# Patient Record
Sex: MALE | Race: OTHER | URBAN - METROPOLITAN AREA
[De-identification: names, ages, dates, MRNs, and addresses within clinical notes are randomized per-mention and may not be internally consistent; named-entity substitution may affect disease eponyms.]

---

## 2017-07-01 ENCOUNTER — OFFICE VISIT (OUTPATIENT)
Dept: FAMILY MEDICINE CLINIC | Age: 27
End: 2017-07-01

## 2017-07-01 VITALS
DIASTOLIC BLOOD PRESSURE: 81 MMHG | HEIGHT: 63 IN | BODY MASS INDEX: 29.23 KG/M2 | SYSTOLIC BLOOD PRESSURE: 135 MMHG | TEMPERATURE: 97.9 F | HEART RATE: 53 BPM | WEIGHT: 165 LBS

## 2017-07-01 DIAGNOSIS — H54.7 VISION PROBLEM: ICD-10-CM

## 2017-07-01 DIAGNOSIS — Z13.9 ENCOUNTER FOR SCREENING: Primary | ICD-10-CM

## 2017-07-01 LAB — GLUCOSE POC: NORMAL MG/DL

## 2017-07-01 NOTE — PROGRESS NOTES
HISTORY OF PRESENT ILLNESS  Dustin Mir is a 32 y.o. male. HPI Comments: Reports that for the last 15 years he has had trouble seeing. Describes it as mostly intermittent, like reading letters sometimes he just sees lines, other times can see the letters. Thinks vision has gotten better in general.  Hasn't really noticed that he can't see well at distances all of the time. Denies fh eye dx, has never been to vision/eye doctor. Eye Problem         ROS    Physical Exam   Eyes: Conjunctivae and EOM are normal. Pupils are equal, round, and reactive to light. Right eye exhibits no discharge. Left eye exhibits no discharge. Fundi and irises, cornea are all normal.         ASSESSMENT and PLAN  Thought he could do better on the eye chart if he tried again, and was able to see at 20/50 with both eyes together.   rec he go to a local optometrist.

## 2017-07-01 NOTE — PROGRESS NOTES
The following was performed at discharge station per provider with the assistance of , Francisco Maria:  RN gave pt a copy of local vision resources and reviewed these with him. RN advised pt that if the optometrist advises him to see an opthalmalogist, he should return to the [facility] to let us know, and he could be referred to [facility]. Pt expressed understanding of the above information. Rashmi Mendez.

## 2017-07-01 NOTE — PROGRESS NOTES
Coordination of Care  1. Have you been to the ER, urgent care clinic since your last visit? Hospitalized since your last visit? No    2. Have you seen or consulted any other health care providers outside of the [address] [place] [address] since your last visit? Include any pap smears or colon screening. No    Medications  Medication Reconciliation Performed: no  Patient does not know need refills     Learning Assessment Complete?  yes

## 2017-07-01 NOTE — PROGRESS NOTES
Results for orders placed or performed in visit on 07/01/17   AMB POC GLUCOSE BLOOD, BY GLUCOSE MONITORING DEVICE   Result Value Ref Range    Glucose POC 88 F mg/dL

## 2018-05-05 ENCOUNTER — OFFICE VISIT (OUTPATIENT)
Dept: FAMILY MEDICINE CLINIC | Age: 28
End: 2018-05-05

## 2018-05-05 VITALS
WEIGHT: 172.6 LBS | SYSTOLIC BLOOD PRESSURE: 123 MMHG | TEMPERATURE: 97.8 F | HEART RATE: 53 BPM | DIASTOLIC BLOOD PRESSURE: 78 MMHG | BODY MASS INDEX: 30.58 KG/M2 | HEIGHT: 63 IN

## 2018-05-05 DIAGNOSIS — L72.9 SUBCUTANEOUS CYST: ICD-10-CM

## 2018-05-05 DIAGNOSIS — S43.491A SPRAIN OF OTHER PART OF RIGHT SHOULDER REGION, INITIAL ENCOUNTER: Primary | ICD-10-CM

## 2018-05-05 RX ORDER — IBUPROFEN 800 MG/1
800 TABLET ORAL
Qty: 30 TAB | Refills: 0 | Status: SHIPPED | OUTPATIENT
Start: 2018-05-05

## 2018-05-05 NOTE — MR AVS SNAPSHOT
[address] [facility] 
 
 
 [place]  [address] 
[phone] Patient: Joan Mendez MRN: [id number] :1990 Visit Information Cely Murphy Personal Médico Departamento Teléfono del Dep. Número de visita 2018  9:30 AM Snehal Hayden NP [facility] 65 [phone] Upcoming Health Maintenance Date Due DTaP/Tdap/Td series (1 - Tdap) 10/1/2011 Influenza Age 5 to Adult 2018 Alergias  Review Complete El: 2018 Por: ADRIEL Ryan del:  2018 No Known Allergies Vacunas actuales Rollo Pupa No hay ninguna vacuna archivada. No revisadas esta visita You Were Diagnosed With   
  
 Leslie Cable Sprain of other part of right shoulder region, initial encounter    -  Primary ICD-10-CM: X33.245W ICD-9-CM: 840.8 Subcutaneous cyst     ICD-10-CM: L72.9 ICD-9-CM: 706.2 Partes vitales PS Pulso Temperatura Minto ( percentil de crecimiento) Peso (percentil de crecimiento) BMI (IMC)  
 123/78 (BP 1 Location: Right arm) (!) 53 97.8 °F (36.6 °C) (Oral) 5' 3.19\" (1.605 m) 172 lb 9.6 oz (78.3 kg) 30.39 kg/m2 Estatus de tabaquísmo Never Smoker Historial de signos vitales BMI and BSA Data Body Mass Index Body Surface Area  
 30.39 kg/m 2 1.87 m 2 [facility] Name Phone [address] [place] [address] [phone] Cardenas lista de medicamentos [facility]izada [facility] 10:33 AM.  Hi Boss use cardenas lista de medicamentos más reciente. ibuprofen 800 mg tablet También conocido brody:  MOTRIN Take 1 Tab by mouth every eight (8) hours as needed for Pain. Egyptian sig Recetas Enviado a la [place]  Refills  
 ibuprofen (MOTRIN) 800 mg tablet 0  
 Sig: Take 1 Tab by mouth every eight (8) hours as needed for Pain. Bulgarian sig Class: Normal  
 Pharmacy: [facility] [address] MOEN SCALES [address] [facility], [address] [facility] #: [phone] Route: Oral  
  
Instrucciones para el Paciente Estiramientos de hombros: Ejercicios - [ Shoulder Stretches: Exercises ] Instrucciones de cuidado Aquí se presentan algunos ejemplos de ejercicios para el hombro. Empiece cada ejercicio lentamente. Reduzca la intensidad del ejercicio si [place] a tener dolor. Mendoza médico o fisioterapeuta le dirán cuándo puede comenzar con estos ejercicios y cuáles funcionarán mejor para usted. Cómo hacer los ejercicios Estiramiento del hombro 1. Párese en un juanis de mitchel y coloque un brazo contra el juanis de jen [place]. El codo debe estar un poco más alto que el hombro. 2. Relaje los hombros a medida que se inclina hacia adelante, permitiendo que se estiren los músculos del pecho y del hombro. También puede girar mendoza cuerpo ligeramente alejándolo de mendoza brazo para estirar los músculos aún [place]. 3. Mantenga la posición entre 15 y 27 segundos. 4. Repita de 2 a 4 veces con cada brazo. Estiramiento del hombro y del pecho 1. Estiramiento del hombro y del pecho 2. Estando sentado, relaje la parte superior de mendoza cuerpo de modo que se deje caer suavemente en mendoza silla. 3. A medida que inhala, enderece la espalda y eleanor los [employer]. 4. Jale suavemente los omóplatos hacia atrás y Ignacio Elders. 5. Mantenga la posición brigido 15 a 30 segundos mientras respira normalmente. 6. Repita de 2 a 4 veces. Estiramiento sobre la [place] 1. [place] ambos brazos por encima de la rosa. 2. Mantenga la posición entre 15 y 27 segundos. 3. Repita de 2 a 4 veces. La atención de seguimiento es adeola parte clave de mendoza tratamiento y seguridad. Asegúrese de hacer y acudir a todas las citas, y llame a mendoza médico si está teniendo problemas.  También es adeola buena idea [employer] resultados de los exámenes y mantener adeola lista de los medicamentos que eric. Dónde puede encontrar más información en inglés? Julien Alvarenga a http://joan-[email or web address].info/. Darling Fails [id number] en la búsqueda para aprender más acerca de \"Estiramientos de hombros: Ejercicios - [ Shoulder Stretches: Exercises ]. \" 
Revisado: 21 marzo, 2017 Versión del contenido: 11.4 © 9654-5244 Healthwise, Incorporated. Las instrucciones de cuidado fueron adaptadas bajo licencia por [employer] (which disclaims liability or warranty for this information). Si usted tiene [place] afección médica o sobre estas instrucciones, siempre pregunte a mendoza profesional de tonia. Healthwise, Incorporated niega toda garantía o responsabilidad por mendoza uso de esta información. Introducing [facility] SERVICES! [facility] introduce portal paciente [employer] . Ahora se puede acceder a partes de mendoza expediente médico, enviar por correo electrónico la oficina de mendoza médico y solicitar renovaciones de medicamentos en línea. En mendoza navegador de Internet , Cleniecyeen Jeanne a https://[email or web address] Learning. [employer]oprise. [employer] Lovely clic en el usuario por [place]un [place]? Bambi See clic aquí en la sesión Samara Elizabeth. Verá la página de registro [place]. Ingrese mendoza código de [employer] jessica y brody aparece a continuación. Usted no tendrá que [employer] código después de ania completado el proceso de registro . Si usted no se inscribe antes de la fecha de caducidad , debe solicitar un nuevo código. · WiQuest Communicationshart Código de acceso : [id number] Expires: 8/3/2018 10:33 AM 
 
Ingresa los últimos cuatro dígitos de mendoza Número de Seguro Social ( xxxx ) y fecha de nacimiento ( dd / mm / aaaa ) brody se indica y lovely clic en Enviar. Usted será llevado a la siguiente página de registro . Crear un ID MyChart . Esta será mendoza ID de inicio de sesión de MyChart y no puede ser [place] , por lo que pensar en adeola que es Gemma Grills y fácil de recordar . Crear adeola contraseña MyChart . Usted puede cambiar mendoza contraseña en cualquier momento . Ingrese mendoza Password Reset de preguntas y [place] . [place] se puede utilizar en un momento posterior si usted olvida mendoza contraseña. Introduzca mendoza dirección de correo electrónico . Lecharys Mort recibirá adeola notificación por correo electrónico cuando la nueva información está disponible en MyChart . [place] clic en Registrarse. Lynn Ferris lanie y descargar porciones de mendoza expediente médico. 
Saulo clic en el enlace de descarga del menú Resumen para descargar adeola copia portátil de mendoza información médica . Si tiene [employer] , por favor visite la sección de preguntas frecuentes del sitio web MyChart . Recuerde, MyChart NO es que se utilizará para las necesidades urgentes. Para emergencias médicas , llame al 911 . Ahora disponible en mendoza iPhone y Android ! Por favor proporcione gina resumen de la documentación de cuidado a mendoza próximo proveedor. If you have any questions after today's visit, please call [phone].

## 2018-05-05 NOTE — PROGRESS NOTES
Coordination of Care  1. Have you been to the ER, urgent care clinic since your last visit? Hospitalized since your last visit? No    2. Have you seen or consulted any other health care providers outside of the [address] [place] [address] since your last visit? Include any pap smears or colon screening. No    Does the patient need refills? NO    Learning Assessment Complete?  yes
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
## 2018-05-05 NOTE — PROGRESS NOTES
At discharge station AVS was printed and reviewed with pt with [place] as . Reviewed and discussed the following as written in provider check out note copied below:   Check-out Note      Stretches when you feel the pain; also every morning before work. Kenneth Perez ibuprofen 800mg prn pain (heavy work).     Bump on head is a simple cyst - not cancer, not harmful.  We discussed signs of infection as reasons to return.      Liset Macario RN

## 2018-05-05 NOTE — PATIENT INSTRUCTIONS
Estiramientos de hombros: Ejercicios - [ Shoulder Stretches: Exercises ]  Instrucciones de cuidado  Aquí se presentan algunos ejemplos de ejercicios para el hombro. Empiece cada ejercicio lentamente. Reduzca la intensidad del ejercicio si [place] a tener dolor. Mendoza médico o fisioterapeuta le dirán cuándo puede comenzar con estos ejercicios y cuáles funcionarán mejor para usted. Cómo hacer los ejercicios  Estiramiento del hombro    1. Párese en un juanis de mitchel y coloque un brazo contra el juanis de la [place]. El codo debe estar un poco más alto que el hombro. 2. Relaje los hombros a medida que se inclina hacia adelante, permitiendo que se estiren los músculos del pecho y del hombro. También puede girar mendoza cuerpo ligeramente alejándolo de mendoza brazo para estirar los músculos aún [place]. 3. Mantenga la posición entre 15 y 27 segundos. 4. Repita de 2 a 4 veces con cada brazo. Estiramiento del hombro y del pecho    1. Estiramiento del hombro y del pecho  2. Estando sentado, relaje la parte superior de mendoza cuerpo de modo que se deje caer suavemente en mendoza silla. 3. A medida que inhala, enderece la espalda y eleanor los [employer]. 4. Jale suavemente los omóplatos hacia atrás y [place]. 5. Mantenga la posición brigido 15 a 30 segundos mientras respira normalmente. 6. Repita de 2 a 4 veces. Estiramiento sobre la rosa    1. [place] ambos brazos por encima de la rosa. 2. Mantenga la posición entre 15 y 27 segundos. 3. Repita de 2 a 4 veces. La atención de seguimiento es adeola parte clave de mendoza tratamiento y seguridad. Asegúrese de hacer y acudir a todas las citas, y llame a mendoza médico si está teniendo problemas. También es adeola buena idea saber los resultados de los exámenes y mantener adeola lista de los medicamentos que eric. ¿Dónde puede encontrar más información en inglés? Rosey Burns a http://joan-[email or web address].info/.   Escriba S254 en la búsqueda para aprender más acerca de \"Estiramientos de hombros: Ejercicios - [ Shoulder Stretches: Exercises ]. \"  Revisado: 21 marzo, 2017  Versión del contenido: 11.4  © 1563-2953 Healthwise, MemberConnection. Las instrucciones de cuidado fueron adaptadas bajo licencia por Good Help Connections (which disclaims liability or warranty for this information). Si usted tiene [place] afección médica o sobre estas instrucciones, siempre pregunte a mendoza profesional de otnia. [employer], MemberConnection niega toda garantía o responsabilidad por mendoza uso de esta información.